# Patient Record
Sex: FEMALE | Race: WHITE | Employment: UNEMPLOYED | URBAN - METROPOLITAN AREA
[De-identification: names, ages, dates, MRNs, and addresses within clinical notes are randomized per-mention and may not be internally consistent; named-entity substitution may affect disease eponyms.]

---

## 2017-05-01 ENCOUNTER — HOSPITAL ENCOUNTER (OUTPATIENT)
Age: 42
Discharge: HOME | End: 2017-05-01
Attending: EMERGENCY MEDICINE
Payer: COMMERCIAL

## 2017-05-01 ENCOUNTER — APPOINTMENT (OUTPATIENT)
Dept: GENERAL RADIOLOGY | Age: 42
End: 2017-05-01
Attending: EMERGENCY MEDICINE
Payer: COMMERCIAL

## 2017-05-01 VITALS
BODY MASS INDEX: 20.89 KG/M2 | RESPIRATION RATE: 16 BRPM | HEART RATE: 60 BPM | DIASTOLIC BLOOD PRESSURE: 56 MMHG | HEIGHT: 66 IN | WEIGHT: 130 LBS | TEMPERATURE: 98 F | SYSTOLIC BLOOD PRESSURE: 121 MMHG | OXYGEN SATURATION: 100 %

## 2017-05-01 DIAGNOSIS — S62.145A: Primary | ICD-10-CM

## 2017-05-01 PROCEDURE — 99203 OFFICE O/P NEW LOW 30 MIN: CPT

## 2017-05-01 PROCEDURE — 29125 APPL SHORT ARM SPLINT STATIC: CPT

## 2017-05-01 PROCEDURE — 99204 OFFICE O/P NEW MOD 45 MIN: CPT

## 2017-05-01 PROCEDURE — 73110 X-RAY EXAM OF WRIST: CPT

## 2017-05-01 NOTE — ED PROVIDER NOTES
CC:Left arm pain     HPI:     Danica Gibson is a 40year old female who presents today with a chief complaint of pain to her left wrist after a fall yesterday.   Patient states she was playing tennis when she fell onto the arm complains of pain to the ul LEFT (CPT=73110) Once  Order Comments:   left wrist injury Pt here to IC with c/o left wrist pain, pt states she fell on tennis court    yesterday. Pt states it hurts to lift and twist wrist.  Resp easy and    regular. No swelling noted.   No tenderness n [address] [place] [address]  [phone]    In 2 days  for recheck        Addendum note 5/1/2017 at 1300 hrs.   Patient called back stating that she had seen Dr. Emmanuel Monsivais of [facility] orthopedics today and he told the patient that there was not an acute f

## 2017-05-01 NOTE — ED NOTES
OCL to left short arm applied by [facility]. CMS check good. Sling in place. Pt verbalizes understanding of home care, follow up care with Ortho and temp. Cast information.

## 2017-05-01 NOTE — ED INITIAL ASSESSMENT (HPI)
Pt here to IC with c/o left wrist pain, pt states she fell on tennis court yesterday. Pt states it hurts to lift and twist wrist.  Resp easy and regular. No swelling noted. No tenderness noted to navicular area. 2+radial pulse.

## 2017-05-01 NOTE — ED NOTES
Pt called and spoke with writer. Pt was saw her orthopedic specialist and does not want to be charged for the sling and OCL application as she feels she did not need this. Dr. Lady Evangelista calling joslyn Holland (pts.  Ortho specialist.)  Pt state

## 2017-07-07 ENCOUNTER — HOSPITAL (OUTPATIENT)
Dept: OTHER | Age: 42
End: 2017-07-07
Attending: OBSTETRICS & GYNECOLOGY

## 2019-11-05 ENCOUNTER — HOSPITAL (OUTPATIENT)
Dept: OTHER | Age: 44
End: 2019-11-05
Attending: OBSTETRICS & GYNECOLOGY

## 2019-11-20 ENCOUNTER — HOSPITAL (OUTPATIENT)
Dept: OTHER | Age: 44
End: 2019-11-20
Attending: OBSTETRICS & GYNECOLOGY

## 2019-12-05 ENCOUNTER — HOSPITAL (OUTPATIENT)
Dept: OTHER | Age: 44
End: 2019-12-05
Attending: OBSTETRICS & GYNECOLOGY

## 2019-12-06 LAB — PATHOLOGIST NAME: NORMAL

## 2020-11-18 ENCOUNTER — TELEPHONE (OUTPATIENT)
Dept: SCHEDULING | Age: 45
End: 2020-11-18

## 2020-11-18 ENCOUNTER — ORDER TRANSCRIPTION (OUTPATIENT)
Dept: ADMINISTRATIVE | Facility: HOSPITAL | Age: 45
End: 2020-11-18

## 2020-11-18 DIAGNOSIS — Z13.9 ENCOUNTER FOR SCREENING: Primary | ICD-10-CM

## 2020-11-24 ENCOUNTER — HOSPITAL ENCOUNTER (OUTPATIENT)
Dept: CT IMAGING | Age: 45
Discharge: HOME | End: 2020-11-24

## 2020-11-24 DIAGNOSIS — E78.00 HYPERCHOLESTEREMIA: ICD-10-CM

## 2020-11-24 PROCEDURE — 75571 CT HRT W/O DYE W/CA TEST: CPT

## 2021-01-12 ENCOUNTER — HOSPITAL ENCOUNTER (OUTPATIENT)
Dept: MAMMOGRAPHY | Age: 46
Discharge: HOME | End: 2021-01-12
Attending: OBSTETRICS & GYNECOLOGY

## 2021-01-12 DIAGNOSIS — N64.89 BREAST ASYMMETRY: ICD-10-CM

## 2021-01-12 PROCEDURE — G0279 TOMOSYNTHESIS, MAMMO: HCPCS

## 2022-01-20 ENCOUNTER — HOSPITAL ENCOUNTER (OUTPATIENT)
Dept: MAMMOGRAPHY | Age: 47
Discharge: HOME | End: 2022-01-20
Attending: OBSTETRICS & GYNECOLOGY

## 2022-01-20 DIAGNOSIS — Z12.31 ENCOUNTER FOR SCREENING MAMMOGRAM FOR MALIGNANT NEOPLASM OF BREAST: ICD-10-CM

## 2022-01-20 PROCEDURE — 77067 SCR MAMMO BI INCL CAD: CPT

## 2023-01-23 ENCOUNTER — HOSPITAL ENCOUNTER (OUTPATIENT)
Dept: CT IMAGING | Age: 48
Discharge: HOME | End: 2023-01-23
Attending: OBSTETRICS & GYNECOLOGY

## 2023-01-23 DIAGNOSIS — Z12.31 VISIT FOR SCREENING MAMMOGRAM: ICD-10-CM

## 2023-01-23 PROCEDURE — 77063 BREAST TOMOSYNTHESIS BI: CPT

## 2024-01-18 ENCOUNTER — LAB REQUISITION (OUTPATIENT)
Dept: OBGYN | Age: 49
End: 2024-01-18

## 2024-01-18 DIAGNOSIS — Z12.4 ENCOUNTER FOR SCREENING FOR MALIGNANT NEOPLASM OF CERVIX: ICD-10-CM

## 2024-01-18 PROCEDURE — 87624 HPV HI-RISK TYP POOLED RSLT: CPT | Performed by: CLINICAL MEDICAL LABORATORY

## 2024-01-18 PROCEDURE — 88175 CYTOPATH C/V AUTO FLUID REDO: CPT | Performed by: CLINICAL MEDICAL LABORATORY

## 2024-01-25 LAB
CASE RPRT: NORMAL
CLINICAL INFO: NORMAL
CYTOLOGY CVX/VAG STUDY: NORMAL
HPV16+18+45 E6+E7MRNA CVX NAA+PROBE: NEGATIVE
Lab: NORMAL
PAP EDUCATIONAL NOTE: NORMAL
SPECIMEN ADEQUACY: NORMAL

## 2024-01-29 ENCOUNTER — HOSPITAL ENCOUNTER (OUTPATIENT)
Dept: CT IMAGING | Age: 49
Discharge: HOME | End: 2024-01-29
Attending: OBSTETRICS & GYNECOLOGY

## 2024-01-29 DIAGNOSIS — Z12.31 ENCOUNTER FOR SCREENING MAMMOGRAM FOR MALIGNANT NEOPLASM OF BREAST: ICD-10-CM

## 2024-01-29 PROCEDURE — 77063 BREAST TOMOSYNTHESIS BI: CPT

## 2025-02-20 ENCOUNTER — APPOINTMENT (OUTPATIENT)
Dept: LAB | Age: 50
End: 2025-02-20

## 2025-02-20 PROBLEM — R92.30 DENSE BREAST: Status: ACTIVE | Noted: 2025-02-20

## 2025-02-20 PROBLEM — Z97.5 PRESENCE OF MIRENA IUD: Status: ACTIVE | Noted: 2025-02-20

## 2025-02-25 ENCOUNTER — APPOINTMENT (OUTPATIENT)
Dept: CT IMAGING | Age: 50
End: 2025-02-25

## 2025-02-25 DIAGNOSIS — Z12.31 ENCOUNTER FOR SCREENING MAMMOGRAM FOR MALIGNANT NEOPLASM OF BREAST: ICD-10-CM

## 2025-02-25 PROCEDURE — 77067 SCR MAMMO BI INCL CAD: CPT

## (undated) NOTE — ED AVS SNAPSHOT
[facility] in [place] [address]    Phone:  [phone]    Fax:  Armando [address]   MRN: [id number]    Department:  [facility]   Date of Visit: and physician's office to determine coverage and benefits available for follow-up care and referrals. It is our goal to assure that you are completely satisfied with every aspect of your visit today.   In an effort to constantly improve our service to y Any imaging studies and labs completed today can be reviewed in your MyChart account. You may have had testing done that requires us to contact you. Please make sure we have your correct phone number on file.      OUR CURRENT HOURS OF OPERATION:  MONDAY T and ask to get set up for an insurance coverage that is in-network with [employer] Goldberg.         MyChart     Visit [employer]  You can access your [employer] to more actively manage your health care and view more details from this visit by going to https:/